# Patient Record
Sex: MALE | Race: WHITE
[De-identification: names, ages, dates, MRNs, and addresses within clinical notes are randomized per-mention and may not be internally consistent; named-entity substitution may affect disease eponyms.]

---

## 2019-12-26 ENCOUNTER — HOSPITAL ENCOUNTER (EMERGENCY)
Dept: HOSPITAL 61 - ER | Age: 49
Discharge: HOME | End: 2019-12-26
Payer: SELF-PAY

## 2019-12-26 VITALS — HEIGHT: 69 IN | WEIGHT: 183.56 LBS | BODY MASS INDEX: 27.19 KG/M2

## 2019-12-26 VITALS — DIASTOLIC BLOOD PRESSURE: 77 MMHG | SYSTOLIC BLOOD PRESSURE: 131 MMHG

## 2019-12-26 DIAGNOSIS — J45.909: ICD-10-CM

## 2019-12-26 DIAGNOSIS — R11.2: ICD-10-CM

## 2019-12-26 DIAGNOSIS — K80.20: Primary | ICD-10-CM

## 2019-12-26 LAB
% BANDS: 2 % (ref 0–9)
% LYMPHS: 3 % (ref 24–48)
% MONOS: 2 % (ref 0–10)
% SEGS: 93 % (ref 35–66)
ALBUMIN SERPL-MCNC: 3.9 G/DL (ref 3.4–5)
ALBUMIN/GLOB SERPL: 1.1 {RATIO} (ref 1–1.7)
ALP SERPL-CCNC: 46 U/L (ref 46–116)
ALT SERPL-CCNC: 14 U/L (ref 16–63)
AMPHETAMINE/METHAMPHETAMINE: (no result)
ANION GAP SERPL CALC-SCNC: 11 MMOL/L (ref 6–14)
APTT PPP: (no result) S
AST SERPL-CCNC: 23 U/L (ref 15–37)
BACTERIA #/AREA URNS HPF: 0 /HPF
BARBITURATES UR-MCNC: (no result) UG/ML
BASOPHILS # BLD AUTO: 0 X10^3/UL (ref 0–0.2)
BASOPHILS NFR BLD: 0 % (ref 0–3)
BENZODIAZ UR-MCNC: (no result) UG/L
BILIRUB SERPL-MCNC: 0.4 MG/DL (ref 0.2–1)
BILIRUB UR QL STRIP: (no result)
BUN SERPL-MCNC: 13 MG/DL (ref 8–26)
BUN/CREAT SERPL: 13 (ref 6–20)
CALCIUM SERPL-MCNC: 8.7 MG/DL (ref 8.5–10.1)
CANNABINOIDS UR-MCNC: (no result) UG/L
CHLORIDE SERPL-SCNC: 106 MMOL/L (ref 98–107)
CO2 SERPL-SCNC: 24 MMOL/L (ref 21–32)
COCAINE UR-MCNC: (no result) NG/ML
CREAT SERPL-MCNC: 1 MG/DL (ref 0.7–1.3)
EOSINOPHIL NFR BLD: 0 % (ref 0–3)
EOSINOPHIL NFR BLD: 0 X10^3/UL (ref 0–0.7)
ERYTHROCYTE [DISTWIDTH] IN BLOOD BY AUTOMATED COUNT: 13.7 % (ref 11.5–14.5)
FIBRINOGEN PPP-MCNC: CLEAR MG/DL
GFR SERPLBLD BASED ON 1.73 SQ M-ARVRAT: 79.4 ML/MIN
GLOBULIN SER-MCNC: 3.7 G/DL (ref 2.2–3.8)
GLUCOSE SERPL-MCNC: 140 MG/DL (ref 70–99)
HCT VFR BLD CALC: 49.6 % (ref 39–53)
HGB BLD-MCNC: 17.2 G/DL (ref 13–17.5)
LIPASE: 93 U/L (ref 73–393)
LYMPHOCYTES # BLD: 0.4 X10^3/UL (ref 1–4.8)
LYMPHOCYTES NFR BLD AUTO: 8 % (ref 24–48)
MCH RBC QN AUTO: 31 PG (ref 25–35)
MCHC RBC AUTO-ENTMCNC: 35 G/DL (ref 31–37)
MCV RBC AUTO: 90 FL (ref 79–100)
METHADONE SERPL-MCNC: (no result) NG/ML
MONO #: 0.1 X10^3/UL (ref 0–1.1)
MONOCYTES NFR BLD: 3 % (ref 0–9)
NEUT #: 4.8 X10^3/UL (ref 1.8–7.7)
NEUTROPHILS NFR BLD AUTO: 90 % (ref 31–73)
NITRITE UR QL STRIP: NEGATIVE
OPIATES UR-MCNC: (no result) NG/ML
PCP SERPL-MCNC: (no result) MG/DL
PH UR STRIP: 6 [PH]
PLATELET # BLD AUTO: 168 X10^3/UL (ref 140–400)
PLATELET # BLD EST: ADEQUATE 10*3/UL
POTASSIUM SERPL-SCNC: 3.9 MMOL/L (ref 3.5–5.1)
PROT SERPL-MCNC: 7.6 G/DL (ref 6.4–8.2)
PROT UR STRIP-MCNC: NEGATIVE MG/DL
RBC # BLD AUTO: 5.49 X10^6/UL (ref 4.3–5.7)
RBC #/AREA URNS HPF: 0 /HPF (ref 0–2)
SODIUM SERPL-SCNC: 141 MMOL/L (ref 136–145)
SQUAMOUS #/AREA URNS LPF: (no result) /LPF
UROBILINOGEN UR-MCNC: 0.2 MG/DL
WBC # BLD AUTO: 5.3 X10^3/UL (ref 4–11)
WBC #/AREA URNS HPF: (no result) /HPF (ref 0–4)

## 2019-12-26 PROCEDURE — 99285 EMERGENCY DEPT VISIT HI MDM: CPT

## 2019-12-26 PROCEDURE — 85025 COMPLETE CBC W/AUTO DIFF WBC: CPT

## 2019-12-26 PROCEDURE — 83690 ASSAY OF LIPASE: CPT

## 2019-12-26 PROCEDURE — 96361 HYDRATE IV INFUSION ADD-ON: CPT

## 2019-12-26 PROCEDURE — 85007 BL SMEAR W/DIFF WBC COUNT: CPT

## 2019-12-26 PROCEDURE — 76705 ECHO EXAM OF ABDOMEN: CPT

## 2019-12-26 PROCEDURE — 80053 COMPREHEN METABOLIC PANEL: CPT

## 2019-12-26 PROCEDURE — 96375 TX/PRO/DX INJ NEW DRUG ADDON: CPT

## 2019-12-26 PROCEDURE — 80307 DRUG TEST PRSMV CHEM ANLYZR: CPT

## 2019-12-26 PROCEDURE — 36415 COLL VENOUS BLD VENIPUNCTURE: CPT

## 2019-12-26 PROCEDURE — 96374 THER/PROPH/DIAG INJ IV PUSH: CPT

## 2019-12-26 PROCEDURE — 81001 URINALYSIS AUTO W/SCOPE: CPT

## 2019-12-26 NOTE — PHYS DOC
Past Medical History


Past Medical History:  Asthma


Past Surgical History:  No Surgical History


Alcohol Use:  None


Drug Use:  None





Adult General


Chief Complaint


Chief Complaint:  ABDOMINAL PAIN





HPI


HPI





Patient is a 49  year old male patient with history of asthma presented to the 

ED today complaining of moderate right upper quadrant abdominal pain for 2 weeks

with nausea and vomiting and diarrhea for 2 days. Patient denies any hematemesis

or melena. Denies any exacerbating or relieving factors. He states is not able 

to eat due to his symptoms. He reports he was seen at [facility] a 

fre[facility]anding clinic for asthma symptoms and they told him to follow-up with a 

general surgeon as an outpatient for his gallbladder problems. He states he 

could not get any help over the foreign when he called the number they gave him.

He also reports he was supposed to have his gallbladder removed in February 2019

but left the hospital AGAINST MEDICAL ADVICE.





Review of Systems


Review of Systems





Constitutional: Denies fever or chills []


Eyes: Denies change in visual acuity, redness, or eye pain []


HENT: Denies nasal congestion or sore throat []


Respiratory: Denies cough or shortness of breath []


Cardiovascular: No additional information not addressed in HPI []


GI: Reports right upper quadrant abdominal pain with nausea vomiting and 

diarrhea


: Denies dysuria or hematuria []


Musculoskeletal: Denies back pain or joint pain []


Integument: Denies rash or skin lesions []


Neurologic: Denies headache, focal weakness or sensory changes []








All other systems were reviewed and found to be within normal limits, except as 

documented in this note.





Current Medications


Current Medications





Current Medications








 Medications


  (Trade)  Dose


 Ordered  [facility]  Start Time


 Stop Time Status Last Admin


Dose Admin


 


 Fentanyl Citrate


  (Fentanyl 2ml


 Vial)  50 mcg  1X  ONCE  12/26/19 10:15


 12/26/19 10:16 DC 12/26/19 10:06


50 MCG


 


 Ondansetron HCl


  (Zofran)  4 mg  1X  ONCE  12/26/19 10:15


 12/26/19 10:16 DC 12/26/19 10:03


4 MG


 


 Sodium Chloride  1,000 ml @ 


 1,000 mls/hr  1X  ONCE  12/26/19 09:45


 12/26/19 10:44 DC 12/26/19 10:03


1,000 MLS/HR











Allergies


Allergies





Allergies








Coded Allergies Type Severity Reaction Last Updated Verified


 


  No Known Drug Allergies    2/5/19 No











Physical Exam


Physical Exam





Constitutional: Well developed, well nourished, no acute distress, non-toxic 

appearance. []


HENT: Normocephalic, atraumatic, bilateral external ears normal, oropharynx 

moist, no oral exudates, nose normal. []


Eyes: PERRLA, EOMI, conjunctiva normal, no discharge. [] 


Neck: Normal range of motion, no tenderness, supple, no stridor. [] 


Cardiovascular:Heart rate regular rhythm, no murmur []


Lungs & Thorax:  Bilateral breath sounds clear to auscultation []


Abdomen: Bowel sounds normal, soft, mild tenderness in the right upper quadrant 

+negative Boone sign, no right lower quadrant tenderness, no masses, no 

pulsatile masses. [] 


Skin: Warm, dry, no erythema, no rash. [] 


Back: No tenderness, no CVA tenderness. [] 


Extremities: No tenderness, no cyanosis, no clubbing, ROM intact, no edema. [] 


Neurologic: Alert and oriented X 3, normal motor function, normal sensory 

function, no focal deficits noted. []


Psychologic: Affect normal, judgement normal, mood normal. []





Current Patient Data


Vital Signs





                                   Vital Signs








  Date Time  Temp Pulse Resp B/P (MAP) Pulse Ox O2 Delivery O2 Flow Rate FiO2


 


12/26/19 10:06   20  99 Room Air  


 


12/26/19 09:30 98.5 73  131/77 (95)    





 98.5       








Lab Values





                                Laboratory Tests








Test


 12/26/19


10:00


 


White Blood Count


 5.3 x10^3/uL


(4.0-11.0)


 


Red Blood Count


 5.49 x10^6/uL


(4.30-5.70)


 


Hemoglobin


 17.2 g/dL


(13.0-17.5)


 


Hematocrit


 49.6 %


(39.0-53.0)


 


Mean Corpuscular Volume


 90 fL ()





 


Mean Corpuscular Hemoglobin 31 pg (25-35)  


 


Mean Corpuscular Hemoglobin


Concent 35 g/dL


(31-37)


 


Red Cell Distribution Width


 13.7 %


(11.5-14.5)


 


Platelet Count


 168 x10^3/uL


(140-400)


 


Neutrophils (%) (Auto) 90 % (31-73)  H


 


Lymphocytes (%) (Auto) 8 % (24-48)  L


 


Monocytes (%) (Auto) 3 % (0-9)  


 


Eosinophils (%) (Auto) 0 % (0-3)  


 


Basophils (%) (Auto) 0 % (0-3)  


 


Neutrophils # (Auto)


 4.8 x10^3/uL


(1.8-7.7)


 


Lymphocytes # (Auto)


 0.4 x10^3/uL


(1.0-4.8)  L


 


Monocytes # (Auto)


 0.1 x10^3/uL


(0.0-1.1)


 


Eosinophils # (Auto)


 0.0 x10^3/uL


(0.0-0.7)


 


Basophils # (Auto)


 0.0 x10^3/uL


(0.0-0.2)


 


Segmented Neutrophils % 93 % (35-66)  H


 


Band Neutrophils % 2 % (0-9)  


 


Lymphocytes % 3 % (24-48)  L


 


Monocytes % 2 % (0-10)  


 


Platelet Estimate


 Adequate


(ADEQUATE)


 


Urine Color Concpeción  


 


Urine Clarity Clear  


 


Urine pH 6.0  


 


Urine Specific Gravity >=1.030  


 


Urine Protein


 Negative mg/dL


(NEG-TRACE)


 


Urine Glucose (UA)


 Negative mg/dL


(NEG)


 


Urine Ketones (Stick)


 Negative mg/dL


(NEG)


 


Urine Blood


 Negative (NEG)





 


Urine Nitrite


 Negative (NEG)





 


Urine Bilirubin Small (NEG)  


 


Urine Urobilinogen Dipstick


 0.2 mg/dL (0.2


mg/dL)


 


Urine Leukocyte Esterase


 Negative (NEG)





 


Urine RBC 0 /HPF (0-2)  


 


Urine WBC


 Occ /HPF (0-4)





 


Urine Squamous Epithelial


Cells None /LPF  





 


Urine Bacteria


 0 /HPF (0-FEW)





 


Urine Mucus Marked /LPF  


 


Sodium Level


 141 mmol/L


(136-145)


 


Potassium Level


 3.9 mmol/L


(3.5-5.1)


 


Chloride Level


 106 mmol/L


()


 


Carbon Dioxide Level


 24 mmol/L


(21-32)


 


Anion Gap 11 (6-14)  


 


Blood Urea Nitrogen


 13 mg/dL


(8-26)


 


Creatinine


 1.0 mg/dL


(0.7-1.3)


 


Estimated GFR


(Cockcroft-Gault) 79.4  





 


BUN/Creatinine Ratio 13 (6-20)  


 


Glucose Level


 140 mg/dL


(70-99)  H


 


Calcium Level


 8.7 mg/dL


(8.5-10.1)


 


Total Bilirubin


 0.4 mg/dL


(0.2-1.0)


 


Aspartate Amino Transferase


(AST) 23 U/L (15-37)





 


Alanine Aminotransferase (ALT)


 14 U/L (16-63)


L


 


Alkaline Phosphatase


 46 U/L


()


 


Total Protein


 7.6 g/dL


(6.4-8.2)


 


Albumin


 3.9 g/dL


(3.4-5.0)


 


Albumin/Globulin Ratio 1.1 (1.0-1.7)  


 


Lipase


 93 U/L


()


 


Urine Opiates Screen Neg (NEG)  


 


Urine Methadone Screen Neg (NEG)  


 


Urine Barbiturates Neg (NEG)  


 


Urine Phencyclidine Screen Neg (NEG)  


 


Urine


Amphetamine/Methamphetamine Neg (NEG)  





 


Urine Benzodiazepines Screen Neg (NEG)  


 


Urine Cocaine Screen Neg (NEG)  


 


Urine Cannabinoids Screen Neg (NEG)  


 


Ethyl Alcohol Level


 < 10 mg/dL


(0-10)


 


Urine Ethyl Alcohol Neg (NEG)  





                                Laboratory Tests


12/26/19 10:00








                                Laboratory Tests


12/26/19 10:00











EKG


EKG


[]





Radiology/Procedures


Radiology/Procedures


[]PROCEDURE: ABDOMEN LTD





EXAM: Abdomen sonogram.


 


HISTORY: Right upper quadrant pain. Cholelithiasis.


 


TECHNIQUE: Sonographic imaging of the abdomen was performed.


 


COMPARISON: 2/5/2019.


 


FINDINGS: The liver is normal in size. No focal hepatic lesion is seen. 


The bladder wall is upper normal in thickness. There is cholelithiasis. 


There is a positive sonographic Boone's sign. The common bile duct is 


normal in caliber. The pancreas and inferior vena cava are obscured due to


bowel gas. The right kidney is unremarkable.


 


IMPRESSION:


1. Cholelithiasis. There is a positive sonographic Boone's sign. The 


gallbladder wall is upper normal in thickness. However, this is not 


clearly within limits to suggest cholecystitis.


2. Partially obscured midline structures due to bowel gas.


 


Electronically signed by: Yamileth Sidhu MD (12/26/2019 10:22 AM) 


[facility]














DICTATED and SIGNED BY:     YAMILETH SIDHU MD


DATE:     12/26/19 1022





Course & Med Decision Making


Course & Med Decision Making


Pertinent Labs and Imaging studies reviewed. (See chart for details)





This is a 49-year-old male patient presented to the ED today complaining of 

right upper quadrant abdominal pain with nausea vomiting and diarrhea. See 

history of present illness. Abdominal pain appears to been going on since 

February. He was diagnosed with cholelithiasis in February and was admitted to 

the hospital where he walked away again this medical advice. He presents today 

wanting his gallbladder removed.








CBC with normal WBC, CMP would not acute findings, lipase is negative.





Abdominal ultrasound was noted for cholelithiasis, no obvious cholecystitis.





Results were communicated to patient and significant other, patient started 

demanding we remove his gallbladder now. He states he would only be admitted if 

we have a surgeon but will remove his gallbladder out.  I he reports he does not

 have medical insurance and does not want to leave but we must remove his 

gallbladder if he stays. He was given demanding we called his surgeon Dr. Welch now. Informed him Dr. Welch is not on call. RN informs me he later 

signed out AMDAVID Francis Disclaimer


Tito Disclaimer


This electronic medical record was generated, in whole or in part, using a voice

 recognition dictation system.





Departure


Departure


Impression:  


   Primary Impression:  


   Cholelithiasis


Disposition:  07 AGAINST MEDICAL ADVICE


Condition:  STABLE


Referrals:  


NO PCP (PCP)





Problem Qualifiers








   Primary Impression:  


   Cholelithiasis


   Cholelithiasis location:  gallbladder  Cholecystitis presence:  without 

   cholecystitis  Biliary obstruction:  without biliary obstruction  Qualified 

   Codes:  K80.20 - Calculus of gallbladder without cholecystitis without 

   obstruction








ANGELIA MARIE APRJONAH              Dec 26, 2019 09:56

## 2019-12-26 NOTE — RAD
EXAM: Abdomen sonogram.

 

HISTORY: Right upper quadrant pain. Cholelithiasis.

 

TECHNIQUE: Sonographic imaging of the abdomen was performed.

 

COMPARISON: 2/5/2019.

 

FINDINGS: The liver is normal in size. No focal hepatic lesion is seen. 

The bladder wall is upper normal in thickness. There is cholelithiasis. 

There is a positive sonographic Boone's sign. The common bile duct is 

normal in caliber. The pancreas and inferior vena cava are obscured due to

bowel gas. The right kidney is unremarkable.

 

IMPRESSION:

1. Cholelithiasis. There is a positive sonographic Boone's sign. The 

gallbladder wall is upper normal in thickness. However, this is not 

clearly within limits to suggest cholecystitis.

2. Partially obscured midline structures due to bowel gas.

 

Electronically signed by: Yamileth Victor MD (12/26/2019 10:22 AM) 

[facility]